# Patient Record
Sex: FEMALE | Race: WHITE | Employment: FULL TIME | URBAN - METROPOLITAN AREA
[De-identification: names, ages, dates, MRNs, and addresses within clinical notes are randomized per-mention and may not be internally consistent; named-entity substitution may affect disease eponyms.]

---

## 2018-01-29 ENCOUNTER — OFFICE VISIT (OUTPATIENT)
Dept: CARDIOLOGY CLINIC | Age: 48
End: 2018-01-29

## 2018-01-29 VITALS
BODY MASS INDEX: 37.44 KG/M2 | SYSTOLIC BLOOD PRESSURE: 142 MMHG | RESPIRATION RATE: 18 BRPM | HEART RATE: 80 BPM | HEIGHT: 69 IN | WEIGHT: 252.8 LBS | DIASTOLIC BLOOD PRESSURE: 82 MMHG

## 2018-01-29 DIAGNOSIS — L40.50 PSORIATIC ARTHRITIS (HCC): ICD-10-CM

## 2018-01-29 DIAGNOSIS — E78.01 FAMILIAL HYPERCHOLESTEREMIA: ICD-10-CM

## 2018-01-29 DIAGNOSIS — M79.7 FIBROMYALGIA: ICD-10-CM

## 2018-01-29 DIAGNOSIS — R06.09 DOE (DYSPNEA ON EXERTION): Primary | ICD-10-CM

## 2018-01-29 DIAGNOSIS — K75.81 NASH (NONALCOHOLIC STEATOHEPATITIS): ICD-10-CM

## 2018-01-29 DIAGNOSIS — E66.09 CLASS 2 OBESITY DUE TO EXCESS CALORIES WITH BODY MASS INDEX (BMI) OF 37.0 TO 37.9 IN ADULT, UNSPECIFIED WHETHER SERIOUS COMORBIDITY PRESENT: ICD-10-CM

## 2018-01-29 DIAGNOSIS — E78.5 DYSLIPIDEMIA: ICD-10-CM

## 2018-01-29 RX ORDER — TRAMADOL HYDROCHLORIDE 100 MG/1
200 TABLET, EXTENDED RELEASE ORAL AS NEEDED
COMMUNITY

## 2018-01-29 RX ORDER — DULOXETIN HYDROCHLORIDE 60 MG/1
60 CAPSULE, DELAYED RELEASE ORAL DAILY
COMMUNITY
End: 2018-03-19 | Stop reason: SDUPTHER

## 2018-01-29 RX ORDER — DULOXETIN HYDROCHLORIDE 30 MG/1
30 CAPSULE, DELAYED RELEASE ORAL DAILY
COMMUNITY
End: 2018-03-19 | Stop reason: ALTCHOICE

## 2018-01-29 NOTE — PATIENT INSTRUCTIONS
Eat a plant-based diet. Avoid processed and packaged foods.      If you stress test is normal, then schedule a CT heart scan: Call [phone] to schedule

## 2018-01-29 NOTE — MR AVS SNAPSHOT
[address] [phone] Patient: Celina Renee MRN: [id number] :1970 Visit Information Date & Time Provider Department Dept. Phone Encounter #  
 2018  8:40 AM Cindra Cockayne, MD [facility] [address] [phone] [id number] Upcoming Health Maintenance Date Due DTaP/Tdap/Td series (1 - Tdap) 1993 PAP AKA CERVICAL CYTOLOGY 1993 Influenza Age 5 to Adult 2017 Allergies as of 2018  Review Complete On: 2018 By: Angle Ramon Severity Noted Reaction Type Reactions Bactrim [Sulfamethoprim Ds] High 2016    Anaphylaxis Sulfa (Sulfonamide Antibiotics) High 2018    Anaphylaxis Current Immunizations  Never Reviewed No immunizations on file. Not reviewed this visit You Were Diagnosed With   
  
 Codes Comments Essential hypertension    -  Primary ICD-10-CM: I10 
ICD-9-CM: 401.9 SOB (shortness of breath)     ICD-10-CM: R06.02 
ICD-9-CM: 786.05 Dyslipidemia     ICD-10-CM: E78.5 ICD-9-CM: 272.4 HILL (dyspnea on exertion)     ICD-10-CM: R06.09 
ICD-9-CM: 786.09   
 HAZEL (nonalcoholic steatohepatitis)     ICD-10-CM: M88.62 ICD-9-CM: 571.8 Psoriatic arthritis ([facility]     ICD-10-CM: L40.50 ICD-9-CM: 696.0 Fibromyalgia     ICD-10-CM: M79.7 ICD-9-CM: 729.1 Class 2 obesity due to excess calories with body mass index (BMI) of 37.0 to 37.9 in adult, unspecified whether serious comorbidity present     ICD-10-CM: E66.09, I81.83 ICD-9-CM: 278.00, V85.37 Vitals BP Pulse Resp Height(growth percentile) Weight(growth percentile) BMI  
 142/82 (BP 1 Location: Right arm) 80 18 5' 9\" (1.753 m) 252 lb 12.8 oz (114.7 kg) 37.33 kg/m2 OB Status Smoking Status Hysterectomy Former Smoker Vitals History BMI and BSA Data Body Mass Index Body Surface Area 37.33 kg/m 2 2.36 m 2 Preferred Pharmacy Pharmacy Name Phone [facility] [phone] Your Updated Medication List  
  
   
This list is accurate as of: 1/29/18  9:27 AM.  Always use your most recent med list.  
  
  
  
  
 amitriptyline 10 mg tablet Commonly known as:  ELAVIL Take  by mouth nightly as needed for Sleep. * CYMBALTA 60 mg capsule Generic drug:  DULoxetine Take 60 mg by mouth daily. * CYMBALTA 30 mg capsule Generic drug:  DULoxetine Take 30 mg by mouth daily. * DULoxetine 60 mg capsule Commonly known as:  CYMBALTA Take 1 Cap by mouth daily. Take one 60 mg cap along with one 30 mg cap to equal 90 mg daily  Indications: FIBROMYALGIA  
  
 gabapentin 800 mg tablet Commonly known as:  NEURONTIN Take 900 mg by mouth three (3) times daily. HUMIRA 40 mg/0.8 mL injection Generic drug:  adalimumab  
by SubCUTAneous route Once every 2 weeks. traMADol 100 mg Tb24 Commonly known as:  ULTRAM-ER Take 200 mg by mouth as needed. VITAMIN D3 PO Take 6,000 Units by mouth daily. * Notice: This list has 3 medication(s) that are the same as other medications prescribed for you. Read the directions carefully, and ask your doctor or other care provider to review them with you. We Performed the Following AMB POC EKG ROUTINE W/ 12 LEADS, INTER & REP [04616 CPT(R)] Patient Instructions Eat a plant-based diet. Avoid processed and packaged foods. If you stress test is normal, then schedule a CT heart scan: Call [facility] to schedule Introducing [facility]! Delmar Caceres introduces [employer] patient portal. Now you can access parts of your medical record, email your doctor's office, and request medication refills online. 1. In your internet browser, go to [email or web address] 2. Click on the First Time User? Click Here link in the Sign In box.  You will see the New Member Sign Up page. 3. Enter your CouponCabin Access Code exactly as it appears below. You will not need to use this code after youve completed the sign-up process. If you do not sign up before the expiration date, you must request a new code. · C[employer] Access Code: [email or web address] Expires: 4/29/2018  9:26 AM 
 
4. Enter the last four digits of your Social Security Number (xxxx) and Date of Birth (mm/dd/yyyy) as indicated and click Submit. You will be taken to the next sign-up page. 5. Create a CouponCabin ID. This will be your CouponCabin login ID and cannot be changed, so think of one that is secure and easy to remember. 6. Create a CouponCabin password. You can change your password at any time. 7. Enter your Password Reset Question and Answer. This can be used at a later time if you forget your password. 8. Enter your e-mail address. You will receive e-mail notification when new information is available in [address]. 9. Click Sign Up. You can now view and download portions of your medical record. 10. Click the Download Summary menu link to download a portable copy of your medical information. If you have questions, please visit the Frequently Asked Questions section of the CouponCabin website. Remember, CouponCabin is NOT to be used for urgent needs. For medical emergencies, dial 911. Now available from your iPhone and Android! Please provide this summary of care documentation to your next provider. Your primary care clinician is listed as Johnie Silverman. If you have any questions after today's visit, please call [phone].

## 2018-01-29 NOTE — PROGRESS NOTES
Sofiya Lea MD [facility]  [address] [facility], [address] [facility]    Office [phone]  Fax [phone]  Cell [phone]        Manish Lodnon is a 41 y.o. female self-referred for evaluation of HILL and dyslipidemia. Assessment  Encounter Diagnoses   Name Primary?  Dyslipidemia     HILL (dyspnea on exertion) Yes    HAZEL (nonalcoholic steatohepatitis)     Psoriatic arthritis (HCC)     Fibromyalgia     Class 2 obesity due to excess calories with body mass index (BMI) of 37.0 to 37.9 in adult, unspecified whether serious comorbidity present     Familial hypercholesteremia        Recommendations:    Ramírez Reynolds ([facility]) has familial dyslipidemia, obesity, HAZEL coupled with strong family hx of early CAD. She has chronic HILL but no anginal chest pain. Her exam and EKG are normal. Will evaluate further with stress echo, consideration of CT heart scan if this is normal. Update lipids with advanced testing using CHL. Regroup to review. We had a long discussion about the importance of diet and lifestyle to improve her risk profile. I am concerned her underlying psoriatic arthritis and FM is contributing to her overall inflammation. Subjective:    Ms. Irais Mays has chronic dyslipidemia and HAZEL, but no hx of heart disease. She reports HILL, fatigue, and \"head pounding\" with exercise, including brisk walking, and will have to sit down to rest.     Most recent lipids from December 2017,  , HDL 92 , . Her brother recently had a MI at age 37. Both of her parents had MIs in their 46s. She suffers from psoriatic arthritis and myalgias. Patient denies any exertional chest pain, palpitations, syncope, orthopnea, edema or paroxysmal nocturnal dyspnea.     Cardiac risk factors   HTN no  DM no  Family hx of CAD yes- both parents with MIs in their 46s, brother with MI at 37    Cardiac testing  No specialty comments available. Past Medical History:   Diagnosis Date    Hypercholesterolemia         Current Outpatient Prescriptions   Medication Sig Dispense Refill    DULoxetine (CYMBALTA) 60 mg capsule Take 60 mg by mouth daily.  DULoxetine (CYMBALTA) 30 mg capsule Take 30 mg by mouth daily.  traMADol (ULTRAM-ER) 100 mg Tb24 Take 200 mg by mouth as needed.  adalimumab (HUMIRA) 40 mg/0.8 mL injection by SubCUTAneous route Once every 2 weeks.  CHOLECALCIFEROL, VITAMIN D3, (VITAMIN D3 PO) Take 6,000 Units by mouth daily.  DULoxetine (CYMBALTA) 60 mg capsule Take 1 Cap by mouth daily. Take one 60 mg cap along with one 30 mg cap to equal 90 mg daily  Indications: FIBROMYALGIA 90 Cap 1    gabapentin (NEURONTIN) 800 mg tablet Take 900 mg by mouth three (3) times daily.  amitriptyline (ELAVIL) 10 mg tablet Take  by mouth nightly as needed for Sleep. 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 +fatigue  Respiratory: Negative for cough, hemoptysis, sputum production and wheezing. +HILL  Cardiovascular: Negative for chest pain, palpitations, orthopnea, claudication, leg swelling and PND. Gastrointestinal: Negative for heartburn, nausea, vomiting, blood in stool and melena. Genitourinary: Negative for dysuria and flank pain. Musculoskeletal: Negative for joint pain and back pain. Skin: Negative for rash. Neurological: Negative for focal weakness, seizures, loss of consciousness, weakness. +\"head pounding\" with exertion  Endo/Heme/Allergies: Does not bruise/bleed easily. Psychiatric/Behavioral: Negative for memory loss. The patient does not have insomnia.       Physical Exam    Visit Vitals    /82 (BP 1 Location: Right arm)    Pulse 80    Resp 18    Ht 5' 9\" (1.753 m)    Wt 252 lb 12.8 oz (114.7 kg)    BMI 37.33 kg/m2     Wt Readings from Last 3 Encounters:   01/29/18 252 lb 12.8 oz (114.7 kg)   02/03/16 235 lb (106.6 kg)      General - well developed well nourished  Neck - JVP normal, thyroid nl  Cardiac - normal S1, S2, no murmurs, rubs or gallops.  No clicks  Vascular - carotids without bruits, radials, femorals and pedal pulses equal bilateral  Lungs - clear to auscultation bilaterals, no rales, wheezing or rhonchi  Abd - soft nontender, no HSM, no abd bruits  Extremities - no edema  Skin - no rash  Neuro - nonfocal  Psych - normal mood and affect      Cardiographics  EKG 1/29/18- SR, normal    Written by Shyanne Blanco, as dictated by Jany Chavez MD.  Jany Chavez MD

## 2018-02-06 ENCOUNTER — CLINICAL SUPPORT (OUTPATIENT)
Dept: CARDIOLOGY CLINIC | Age: 48
End: 2018-02-06

## 2018-02-06 DIAGNOSIS — E78.5 DYSLIPIDEMIA: ICD-10-CM

## 2018-02-06 DIAGNOSIS — E78.01 FAMILIAL HYPERCHOLESTEREMIA: ICD-10-CM

## 2018-02-06 DIAGNOSIS — E66.09 CLASS 2 OBESITY DUE TO EXCESS CALORIES WITH BODY MASS INDEX (BMI) OF 37.0 TO 37.9 IN ADULT, UNSPECIFIED WHETHER SERIOUS COMORBIDITY PRESENT: ICD-10-CM

## 2018-02-06 DIAGNOSIS — R06.09 DOE (DYSPNEA ON EXERTION): Primary | ICD-10-CM

## 2018-02-06 NOTE — PROGRESS NOTES
See scanned report. Dr. Almita Go ordered study and Dr. Almita Go read study. ID verified per protocol. Explained procedure to patient. All concerns and questions addressed prior to obtaining consent test. Patient developed dyspnea and chest tightness \"5\" during test. At 7:30 in recovery, patient without symptoms or complaints voiced.

## 2018-02-08 ENCOUNTER — TELEPHONE (OUTPATIENT)
Dept: CARDIOLOGY CLINIC | Age: 48
End: 2018-02-08

## 2018-02-08 NOTE — TELEPHONE ENCOUNTER
Ren Harmon from [facility] calling in regards to the pt's blood work. She said the lab is unable to run the following tests due being out of a sample: apoa, apob, acmp, hscrp, insulin, lipid panel, LPa, abnp, & sdldl. She said if there are any questions please give her a call back @ [phone]. Thanks!   Merry Sarkar

## 2018-02-08 NOTE — TELEPHONE ENCOUNTER
Patient will have her labs redrawn. Confirmed with Jasmyn Douglas from [facility] that she will not be charged an additional draw fee.

## 2018-02-12 ENCOUNTER — TELEPHONE (OUTPATIENT)
Dept: CARDIOLOGY CLINIC | Age: 48
End: 2018-02-12

## 2018-02-12 NOTE — TELEPHONE ENCOUNTER
Patient notified. She voices understanding. Confirmed that she has information for CT heart scan. Appointment 2/13/18 to have labs redrawn.

## 2018-02-12 NOTE — TELEPHONE ENCOUNTER
Cardiac testing  Exercise Stress Echo 2/6/18 - 9:30. Moderate chest pain reported during peak exercise. Target HR was achieved. Negative stress EKG. EF 60%. Normal study. Ms. Kelsie Campbell was seen as a new patient by Dr. Elizabeth Nelson on 1/29/18. Self referred for evaluation of HILL and dyslipidemia. I have left a voicemail for her to review normal Exercise stress echo results. As discussed with Dr. Elizabeth Nelson, further evaluation for subclinical CAD with CT heart scan could be considered. Advanced lipid studies are pending. Plan to phone follow up to review these results once available.

## 2018-02-13 ENCOUNTER — TELEPHONE (OUTPATIENT)
Dept: CARDIOLOGY CLINIC | Age: 48
End: 2018-02-13

## 2018-02-13 NOTE — TELEPHONE ENCOUNTER
Chaz Roman from [facility] calling to iris an emended report. Please give her a call back @ [phone] option 1. Thanks!   Sita Kline

## 2018-02-14 LAB
% ALBUMIN, 58A: 61 % (ref 54–71)
25(OH)D3 SERPL-MCNC: 21 NG/ML (ref 30–100)
ALB/GLOBRATIO, 58C: 1.57 (ref 1.15–2.5)
ALBUMIN SERPL-MCNC: 4.5 G/DL (ref 3.7–5.1)
ALP SERPL-CCNC: 100 U/L (ref 35–125)
ALT SERPL-CCNC: 45 U/L
ANION GAP SERPL CALC-SCNC: 13 MMOL/L (ref 6–18)
APOLIPOPROTEIN A-1, 6: 142 MG/DL
APOLIPOPROTEIN B , 48: 145 MG/DL
AST SERPL W P-5'-P-CCNC: 29 U/L (ref 5–32)
BILIRUB SERPL-MCNC: 0.6 MG/DL
BUN SERPL-MCNC: 16 MG/DL (ref 6–20)
BUN/CREATININE RATIO,BUCR: 20 (ref 10–27)
CALCIUM SERPL-MCNC: 10 MG/DL (ref 8.8–10.5)
CHLORIDE SERPL-SCNC: 103 MMOL/L (ref 98–110)
CHOLEST SERPL-MCNC: 265 MG/DL
CO2 SERPL-SCNC: 25 MMOL/L (ref 19–31)
CREAT SERPL-MCNC: 0.8 MG/DL (ref 0.5–0.9)
CRP SERPL HS-MCNC: 14.5 MG/L
EGFRAACREAT: 108 ML/MIN/1.73M^2
EGFRNACREAT: 93 ML/MIN/1.73M^2
FFA FREE FATTY ACIDS, 64: 0.48 MMOL/L
GLOBCALC, 58B: 2.9 G/DL (ref 1.9–3.5)
GLUCOSE SERPL-MCNC: 106 MG/DL (ref 70–99)
HDL 2 SUBCLASS, 65: 11 MG/DL
HDLC SERPL-MCNC: 59 MG/DL
HOMOCYSTEINE,PLASMA,HOMCY: 11 UMOL/L
INSULIN,INS: 23 UU/ML (ref 3–9)
LDLC SERPL CALC-MCNC: 194 MG/DL
LP-PLA2, 123241: 241 NG/ML
MPOAU: 373 PMOL/L
NON-HDL CHOLESTEROL, 011976: 207 MG/DL
POTASSIUM SERPL-SCNC: 4.2 MMOL/L (ref 3.5–5.3)
PRO BNP NT,BNPNT: 21 PG/ML
PROT SERPL-MCNC: 7.4 G/DL (ref 6.1–8)
SMALL DENSE LDL, 47: 60 MG/DL
SODIUM SERPL-SCNC: 141 MMOL/L (ref 133–145)
TRIGL SERPL-MCNC: 126 MG/DL (ref ?–150)

## 2018-02-15 LAB
CAMPESTEROL, HDL1302: 2.89 UG/ML (ref 2.11–4.43)
CHOLESTANOL, HDL1304: 3.16 UG/ML (ref 2.02–3.47)
DESMOSTEROL, HDL1306: 1.38 UG/ML
HDL HDL-P, HDL5001: 42.5 UMOL/L
HDL LDL-P, HDL5000: 2726 NMOL/L
HDL SLDL-P, HDL5002: 1216 NMOL/L
LP(A)-P, HDL4000: 254 NMOL/L
SITOSTEROL, HDL1300: 2.11 UG/ML (ref 1.43–3.17)

## 2018-02-16 LAB
AA2: 16.68 % (ref 10.5–23.3)
ALPHA LINOLEIC ACID N3, HDL1202: 0.12 % (ref 0.1–0.4)
CIS-MONO-UNSATURATED FATTY ACID TOTAL, HDL1205: 15.2 (ref 11.5–20.5)
DOCOSAHEXAENOIC ACID N3, HDL1208: 4.01 % (ref 0.1–8.4)
DOCOSAPENTAENOIC ACID N3, HDL1206: 2.49 % (ref 0.6–4.1)
DOCOSAPENTAENOIC ACID N6, HDL1207: 0.84 % (ref 0.1–1.3)
EPA2: 0.71 % (ref 0.1–2.5)
LINOLEIC ACID C6, HDL1216: 12.34 % (ref 4.6–21.3)
OMEGA-3 FATTY ACID TOTAL, HDL1220: 7.3 (ref 0.1–14.1)
OMEGA-3 INDEX, HDL1219: 4.7 (ref 0.1–10.4)
OMEGA-6 FATTY ACID TOTAL, HDL1222: 36.2 (ref 28.6–44.5)
SATURATED FATTY ACID TOTAL, HDL1226: 40.7 (ref 36.6–42)
TRANS FATTY ACID TOTAL, HDL1232: 0.6 (ref 0.1–1.8)
TRANSLINOLEIC ACID, HDL1229: <0.1 %
TRANSOLEIC ACID, HDL1230: 0.39 % (ref 0.1–1.3)

## 2018-02-28 ENCOUNTER — TELEPHONE (OUTPATIENT)
Dept: CARDIOLOGY CLINIC | Age: 48
End: 2018-02-28

## 2018-02-28 NOTE — TELEPHONE ENCOUNTER
[facility] were reviewed - evidence of familial dyslipidemia, IR and Vit D deficiency. She still plans to have CT Heart scan performed in the near future. I have asked her to return to the office in the next few weeks, following CT heart scan, so that we can review that data and recent lab work in person. She is agreeable with this plan. Will have our office contact her to schedule follow up visit.

## 2018-03-02 ENCOUNTER — HOSPITAL ENCOUNTER (OUTPATIENT)
Dept: CT IMAGING | Age: 48
Discharge: HOME | End: 2018-03-02
Payer: SELF-PAY

## 2018-03-02 DIAGNOSIS — Z00.00 PREVENTATIVE HEALTH CARE: ICD-10-CM

## 2018-03-02 PROCEDURE — 75571 CT HRT W/O DYE W/CA TEST: CPT

## 2018-03-05 NOTE — CARDIO/PULMONARY
Cardiac Rehab: Attempted to contact pt about calcium scoring results. Left message on cell phone voice mail. Encouraged pt to check results on My Chart or contact Dr Jayla Zhang office. Sent copy of report to pt with handout on learning about CAD. Sent Dr Raheem Howard message regarding results via [facility].

## 2018-03-19 ENCOUNTER — OFFICE VISIT (OUTPATIENT)
Dept: CARDIOLOGY CLINIC | Age: 48
End: 2018-03-19

## 2018-03-19 VITALS
SYSTOLIC BLOOD PRESSURE: 150 MMHG | HEIGHT: 69 IN | WEIGHT: 253 LBS | HEART RATE: 96 BPM | BODY MASS INDEX: 37.47 KG/M2 | DIASTOLIC BLOOD PRESSURE: 80 MMHG

## 2018-03-19 DIAGNOSIS — R93.1 AGATSTON CAC SCORE, <100: ICD-10-CM

## 2018-03-19 DIAGNOSIS — E66.09 CLASS 2 OBESITY DUE TO EXCESS CALORIES WITH BODY MASS INDEX (BMI) OF 37.0 TO 37.9 IN ADULT, UNSPECIFIED WHETHER SERIOUS COMORBIDITY PRESENT: ICD-10-CM

## 2018-03-19 DIAGNOSIS — E78.01 FAMILIAL HYPERCHOLESTEREMIA: Primary | ICD-10-CM

## 2018-03-19 DIAGNOSIS — E78.41 ELEVATED LP(A): ICD-10-CM

## 2018-03-19 DIAGNOSIS — L40.50 PSORIATIC ARTHRITIS (HCC): ICD-10-CM

## 2018-03-19 DIAGNOSIS — E66.01 SEVERE OBESITY (BMI 35.0-39.9) WITH COMORBIDITY (HCC): ICD-10-CM

## 2018-03-19 DIAGNOSIS — E55.9 VITAMIN D DEFICIENCY: ICD-10-CM

## 2018-03-19 DIAGNOSIS — K75.81 NASH (NONALCOHOLIC STEATOHEPATITIS): ICD-10-CM

## 2018-03-19 PROBLEM — R06.09 DOE (DYSPNEA ON EXERTION): Status: RESOLVED | Noted: 2018-01-29 | Resolved: 2018-03-19

## 2018-03-19 RX ORDER — TIZANIDINE HYDROCHLORIDE 4 MG/1
4 CAPSULE, GELATIN COATED ORAL
COMMUNITY

## 2018-03-19 RX ORDER — METFORMIN HYDROCHLORIDE 500 MG/1
1000 TABLET, EXTENDED RELEASE ORAL
Qty: 60 TAB | Refills: 3 | Status: SHIPPED | OUTPATIENT
Start: 2018-03-19 | End: 2018-07-23 | Stop reason: SDUPTHER

## 2018-03-19 RX ORDER — ERGOCALCIFEROL 1.25 MG/1
50000 CAPSULE ORAL
Qty: 8 CAP | Refills: 2 | Status: SHIPPED | OUTPATIENT
Start: 2018-03-22 | End: 2018-07-23 | Stop reason: SDUPTHER

## 2018-03-19 RX ORDER — ROSUVASTATIN CALCIUM 20 MG/1
20 TABLET, COATED ORAL
Qty: 30 TAB | Refills: 5 | Status: SHIPPED | OUTPATIENT
Start: 2018-03-19 | End: 2018-07-23 | Stop reason: SDUPTHER

## 2018-03-19 NOTE — PATIENT INSTRUCTIONS
Start Crestor 20 mg daily    Start Vitamin D2 50,000 units twice weekly x 3 months    Stop Vitamin D3    Start CoQ10 100 mg twice daily (Arturo brand is fine)    Start Metformin  mg daily x 1 month then 1000 mg daily thereafter    Continue Aspirin 81 mg daily

## 2018-03-19 NOTE — PROGRESS NOTES
[address]  [address]  [place], [address] [facility]    Office [phone]  Fax [phone]  Cell [phone]        Erik Mayer is a 41 y.o. female last seen 2 months ago. Assessment  Encounter Diagnoses   Name Primary?  Class 2 obesity due to excess calories with body mass index (BMI) of 37.0 to 37.9 in adult, unspecified whether serious comorbidity present     Elevated Lp(a)     HAZEL (nonalcoholic steatohepatitis)     Psoriatic arthritis (HCC)     Severe obesity (BMI 35.0-39. 9) with comorbidity ([facility]     Vitamin D deficiency     Agatston CAC score, <100     Familial hypercholesteremia Yes       Recommendations:    Subclinical CAD by CT heart scan, low CAC (3). Stress echo normal. Reviewed sxs that would raise concern. Familial dyslipidemia coupled with IR. 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 Start Crestor 20 mg/d +Co Q10 100 mg BID. Long discussion about clean eating and healthy weight loss. Refer to [facility]  for nutritional counseling. Prediabetes. Start Metformin  mg/d. Nutrition advised as above. Low Vitamin D despite Vitamin D3 6k units daily. Replace with Vitamin D2 50,000 units twice weekly x3 months. Elevated CRP. Likely due to systemic illness. Update labs in 3 months. Follow-up Disposition:  Return in about 3 months (around 6/19/2018). [facility] labs prior     Subjective:    Ms. Yuliana Elder has chronic dyslipidemia and HAZEL, but no hx of heart disease. She did have a random spell of chest pain, but her EKG at the time was normal. She denies any exertional symptoms. She is taking 6000 units of Vitamin D a day. She does eat a low-sodium diet. Patient denies any exertional chest pain, palpitations, syncope, orthopnea, edema or paroxysmal nocturnal dyspnea.     Lab Results   Component Value Date/Time    Cholesterol, total 265 (H) 02/13/2018 09:00 AM    HDL Cholesterol 59 02/13/2018 09:00 AM LDL, calculated 194 (H) 02/13/2018 09:00 AM    Triglyceride 126 02/13/2018 09:00 AM     Her brother recently had a MI at age 37. Both of her parents had MIs in their 46s. She suffers from psoriatic arthritis and fibromyalgia. Cardiac risk factors   HTN no  DM no  Family hx of CAD yes- both parents with MIs in their 46s, brother with MI at 37    Cardiac testing  Exercise Stress Echo 2/6/18 - 9:30. Moderate chest pain reported during peak exercise. Target HR was achieved. Negative stress EKG. EF 60%. Normal study. CT heart scan 3/2/18 - 3 (LAD)    Past Medical History:   Diagnosis Date    Elevated Lp(a) 3/19/2018    Hypercholesterolemia         Current Outpatient Prescriptions   Medication Sig Dispense Refill    tiZANidine (ZANAFLEX) 4 mg capsule Take 4 mg by mouth nightly.  ergocalciferol (ERGOCALCIFEROL) 50,000 unit capsule Take 1 Cap by mouth two (2) days a week. 8 Cap 2    rosuvastatin (CRESTOR) 20 mg tablet Take 1 Tab by mouth nightly. 30 Tab 5    metFORMIN ER (GLUCOPHAGE XR) 500 mg tablet Take 2 Tabs by mouth daily (with dinner). 60 Tab 3    traMADol (ULTRAM-ER) 100 mg Tb24 Take 200 mg by mouth as needed.  adalimumab (HUMIRA) 40 mg/0.8 mL injection by SubCUTAneous route Once every 2 weeks.  DULoxetine (CYMBALTA) 60 mg capsule Take 1 Cap by mouth daily. 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 +fatigue  Respiratory: Negative for cough, hemoptysis, sputum production and wheezing. +HILL  Cardiovascular: Negative for chest pain, palpitations, orthopnea, claudication, leg swelling and PND.   Gastrointestinal: Negative for heartburn, nausea, vomiting, blood in stool and melena. Genitourinary: Negative for dysuria and flank pain. Musculoskeletal: Negative for joint pain and back pain. Skin: Negative for rash. Neurological: Negative for focal weakness, seizures, loss of consciousness, weakness. +\"head pounding\" with exertion  Endo/Heme/Allergies: Does not bruise/bleed easily. Psychiatric/Behavioral: Negative for memory loss. The patient does not have insomnia. Physical Exam    Visit Vitals    /80    Pulse 96    Ht 5' 9\" (1.753 m)    Wt 253 lb (114.8 kg)    BMI 37.36 kg/m2     Wt Readings from Last 3 Encounters:   03/19/18 253 lb (114.8 kg)   01/29/18 252 lb 12.8 oz (114.7 kg)   02/03/16 235 lb (106.6 kg)      General - well developed well nourished  Neck - JVP normal, thyroid nl  Cardiac - normal S1, S2, no murmurs, rubs or gallops.  No clicks  Vascular - carotids without bruits, radials, femorals and pedal pulses equal bilateral  Lungs - clear to auscultation bilaterals, no rales, wheezing or rhonchi  Abd - soft nontender, no HSM, no abd bruits  Extremities - no edema  Skin - no rash  Neuro - nonfocal  Psych - normal mood and affect      Cardiographics  EKG 1/29/18- SR, normal  CT heart scan 3/2/18 - 3 (LAD)    Written by Saji Solomon, as dictated by True Lafleur MD.  True Lafleur MD

## 2018-03-19 NOTE — PROGRESS NOTES
Visit Vitals    /80    Pulse 96    Ht 5' 9\" (1.753 m)    Wt 253 lb (114.8 kg)    BMI 37.36 kg/m2     Medication changes for this OV VO Dr Kavitha Clay

## 2018-03-19 NOTE — MR AVS SNAPSHOT
[address] [facility] [address] [facility] 
[phone] Patient: Royal Avila MRN: [id number] :1970 Visit Information Date & Time Provider Department Dept. Phone Encounter #  
 3/19/2018  3:20 PM Lamonte Jenkins MD [facility] [address] [phone] [id number] Follow-up Instructions Return in about 3 months (around 2018). Your Appointments 2018  2:40 PM  
ESTABLISHED PATIENT with Lamonte Jenkins MD  
[facility] (LORE SCHEDULING) Appt Note: 3m f/u  
 [facility] [address] [facility]  
[address] [facility] [address] [address] Upcoming Health Maintenance Date Due DTaP/Tdap/Td series (1 - Tdap) 1993 PAP AKA CERVICAL CYTOLOGY 1993 Influenza Age 5 to Adult 2017 Allergies as of 3/19/2018  Review Complete On: 3/19/2018 By: Yury Mallory LPN Severity Noted Reaction Type Reactions Bactrim [Sulfamethoprim Ds] High 2016    Anaphylaxis Sulfa (Sulfonamide Antibiotics) High 2018    Anaphylaxis Current Immunizations  Never Reviewed No immunizations on file. Not reviewed this visit You Were Diagnosed With   
  
 Codes Comments Dyslipidemia    -  Primary ICD-10-CM: E78.5 ICD-9-CM: 272.4 HILL (dyspnea on exertion)     ICD-10-CM: R06.09 
ICD-9-CM: 786.09 Class 2 obesity due to excess calories with body mass index (BMI) of 37.0 to 37.9 in adult, unspecified whether serious comorbidity present     ICD-10-CM: E66.09, X93.52 ICD-9-CM: 278.00, V85.37 Vitals BP Pulse Height(growth percentile) Weight(growth percentile) BMI OB Status 150/80 96 5' 9\" (1.753 m) 253 lb (114.8 kg) 37.36 kg/m2 Hysterectomy Smoking Status Former Smoker Vitals History BMI and BSA Data Body Mass Index Body Surface Area  
 37.36 kg/m 2 2.36 m 2 Preferred Pharmacy Pharmacy Name Phone [facility] [address], [address] [place] [address] [phone] Your Updated Medication List  
  
   
This list is accurate as of 3/19/18  3:53 PM.  Always use your most recent med list.  
  
  
  
  
 DULoxetine 60 mg capsule Commonly known as:  CYMBALTA Take 1 Cap by mouth daily. Take one 60 mg cap along with one 30 mg cap to equal 90 mg daily  Indications: FIBROMYALGIA  
  
 HUMIRA 40 mg/0.8 mL injection Generic drug:  adalimumab  
by SubCUTAneous route Once every 2 weeks. traMADol 100 mg Tb24 Commonly known as:  ULTRAM-ER Take 200 mg by mouth as needed. VITAMIN D3 PO Take 6,000 Units by mouth daily. ZANAFLEX 4 mg capsule Generic drug:  tiZANidine Take 4 mg by mouth nightly. Follow-up Instructions Return in about 3 months (around 6/19/2018). Patient Instructions Start Crestor 20 mg daily Start Vitamin D2 50,000 units twice weekly x 3 months Stop Vitamin D3 Start CoQ10 100 mg twice daily (Arturo brand is fine) Start Metformin  mg daily x 1 month then 1000 mg daily thereafter Continue Aspirin 81 mg daily Introducing [facility]! Clover Vidal introduces [employer] patient portal. Now you can access parts of your medical record, email your doctor's office, and request medication refills online. 1. In your internet browser, go to [email or web address] Dream 2. Click on the First Time User? Click Here link in the Sign In box. You will see the New Member Sign Up page. 3. Enter your [employer] Access Code exactly as it appears below. You will not need to use this code after youve completed the sign-up process. If you do not sign up before the expiration date, you must request a new code. · [employer] Access Code: [email or web address] Expires: 4/29/2018 10:26 AM 
 
 4. Enter the last four digits of your Social Security Number (xxxx) and Date of Birth (mm/dd/yyyy) as indicated and click Submit. You will be taken to the next sign-up page. 5. Create a Hungry Local ID. This will be your [employer]ry Local login ID and cannot be changed, so think of one that is secure and easy to remember. 6. Create a [employer]ry Local password. You can change your password at any time. 7. Enter your Password Reset Question and Answer. This can be used at a later time if you forget your password. 8. Enter your e-mail address. You will receive e-mail notification when new information is available in [address] 9. Click Sign Up. You can now view and download portions of your medical record. 10. Click the Download Summary menu link to download a portable copy of your medical information. If you have questions, please visit the Frequently Asked Questions section of the [employer]ry Local website. Remember, [employer] is NOT to be used for urgent needs. For medical emergencies, dial 911. Now available from your iPhone and Android! Please provide this summary of care documentation to your next provider. Your primary care clinician is listed as Stephenie Rivas. If you have any questions after today's visit, please call [phone].

## 2018-06-12 ENCOUNTER — DOCUMENTATION ONLY (OUTPATIENT)
Dept: CARDIOLOGY CLINIC | Age: 48
End: 2018-06-12

## 2018-07-23 ENCOUNTER — OFFICE VISIT (OUTPATIENT)
Dept: CARDIOLOGY CLINIC | Age: 48
End: 2018-07-23

## 2018-07-23 VITALS
WEIGHT: 257 LBS | HEART RATE: 83 BPM | OXYGEN SATURATION: 99 % | SYSTOLIC BLOOD PRESSURE: 140 MMHG | DIASTOLIC BLOOD PRESSURE: 84 MMHG | BODY MASS INDEX: 38.06 KG/M2 | HEIGHT: 69 IN | RESPIRATION RATE: 18 BRPM

## 2018-07-23 DIAGNOSIS — E55.9 VITAMIN D DEFICIENCY: ICD-10-CM

## 2018-07-23 DIAGNOSIS — M79.7 FIBROMYALGIA: ICD-10-CM

## 2018-07-23 DIAGNOSIS — L40.50 PSORIATIC ARTHRITIS (HCC): ICD-10-CM

## 2018-07-23 DIAGNOSIS — K75.81 NASH (NONALCOHOLIC STEATOHEPATITIS): ICD-10-CM

## 2018-07-23 DIAGNOSIS — E66.01 SEVERE OBESITY (BMI 35.0-39.9) WITH COMORBIDITY (HCC): ICD-10-CM

## 2018-07-23 DIAGNOSIS — E78.41 ELEVATED LP(A): ICD-10-CM

## 2018-07-23 DIAGNOSIS — E66.09 CLASS 2 OBESITY DUE TO EXCESS CALORIES WITH BODY MASS INDEX (BMI) OF 37.0 TO 37.9 IN ADULT, UNSPECIFIED WHETHER SERIOUS COMORBIDITY PRESENT: ICD-10-CM

## 2018-07-23 DIAGNOSIS — E78.5 DYSLIPIDEMIA: Primary | ICD-10-CM

## 2018-07-23 DIAGNOSIS — R93.1 AGATSTON CAC SCORE, <100: ICD-10-CM

## 2018-07-23 DIAGNOSIS — E78.01 FAMILIAL HYPERCHOLESTEREMIA: ICD-10-CM

## 2018-07-23 RX ORDER — METFORMIN HYDROCHLORIDE 500 MG/1
1000 TABLET, EXTENDED RELEASE ORAL
Qty: 60 TAB | Refills: 3 | Status: SHIPPED | OUTPATIENT
Start: 2018-07-23

## 2018-07-23 RX ORDER — ROSUVASTATIN CALCIUM 20 MG/1
20 TABLET, COATED ORAL
Qty: 30 TAB | Refills: 5 | Status: SHIPPED | OUTPATIENT
Start: 2018-07-23

## 2018-07-23 RX ORDER — ERGOCALCIFEROL 1.25 MG/1
50000 CAPSULE ORAL
Qty: 8 CAP | Refills: 2 | Status: SHIPPED | OUTPATIENT
Start: 2018-07-26

## 2018-07-23 NOTE — PROGRESS NOTES
[address] [place], [address] [facility] Office [phone] Fax [phone] Will Free is a 41 y.o. female last seen 4 months ago. Assessment Encounter Diagnoses Name Primary?  HAZEL (nonalcoholic steatohepatitis)  Vitamin D deficiency  Severe obesity (BMI 35.0-39. 9) with comorbidity ([facility] [address]  Familial hypercholesteremia  Elevated Lp(a)  Dyslipidemia Yes  Class 2 obesity due to excess calories with body mass index (BMI) of 37.0 to 37.9 in adult, unspecified whether serious comorbidity present  Agatston CAC score, <100  Psoriatic arthritis ([facility] [address]  Fibromyalgia Recommendations: Ms. Valentine Newton has subclinical CAD by CT heart scan, low CAC (3). Stress echo 2/6/18 was normal. Reviewed sxs that would raise concern. 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 Update [facility] health labs in the near future. Phone follow up to review results. Strongly encouraged her to work towards a heart healthy diet low in simple carbohydrates and find some form on exercise that if amenable to her underlying inflammatory conditions. Encouraged her to continue current medications and call with any new complaints or concerns. Follow-up Disposition: 
Return in about 3 months (around 10/23/2018). Subjective: 
Ms. 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 She has recently changed jobs and finds that this role is less stressful. She has been unable to perform any additional exercise due to ongoing joint and back pain which she attributes to fibromyalgia and psoriatic arthritis. She is not following a structured dietary plan. She did begin taking Metformin and has successfully increased it up to 1000 mg with dinner. Unfortunately, she has seen a small weight gain since her last visit due.    
 
Additionally, she began Crestor 20 mg daily. She did not have repeat lab work. She denies any palpitations, syncope, orthopnea, edema or paroxysmal nocturnal dyspnea. Lab Results Component Value Date/Time Cholesterol, total 265 (H) 02/13/2018 09:00 AM  
 HDL Cholesterol 59 02/13/2018 09:00 AM  
 LDL, calculated 194 (H) 02/13/2018 09:00 AM  
 Triglyceride 126 02/13/2018 09:00 AM  
 
Cardiac risk factors HTN no 
DM no Family hx of CAD yes- both parents with MIs in their 46s, brother with MI at 37 Cardiac testing Exercise Stress Echo 2/6/18 - 9:30. Moderate chest pain reported during peak exercise. Target HR was achieved. Negative stress EKG. EF 60%. Normal study. CT heart scan 3/2/18 - 3 (LAD) Past Medical History:  
Diagnosis Date  Elevated Lp(a) 3/19/2018  Hypercholesterolemia Current Outpatient Prescriptions Medication Sig Dispense Refill  tiZANidine (ZANAFLEX) 4 mg capsule Take 4 mg by mouth nightly.  ergocalciferol (ERGOCALCIFEROL) 50,000 unit capsule Take 1 Cap by mouth two (2) days a week. 8 Cap 2  
 rosuvastatin (CRESTOR) 20 mg tablet Take 1 Tab by mouth nightly. 30 Tab 5  
 metFORMIN ER (GLUCOPHAGE XR) 500 mg tablet Take 2 Tabs by mouth daily (with dinner). 60 Tab 3  
 traMADol (ULTRAM-ER) 100 mg Tb24 Take 200 mg by mouth as needed.  DULoxetine (CYMBALTA) 60 mg capsule Take 1 Cap by mouth daily. Take one 60 mg cap along with one 30 mg cap to equal 90 mg daily  Indications: FIBROMYALGIA 90 Cap 1  
 adalimumab (HUMIRA) 40 mg/0.8 mL injection by SubCUTAneous route Once every 2 weeks. Allergies Allergen Reactions  Bactrim [Sulfamethoprim Ds] Anaphylaxis  Sulfa (Sulfonamide Antibiotics) Anaphylaxis Director of Nursing at [employer]. Review of Systems Constitutional: Negative for fever, chills and diaphoresis. +fatigue Respiratory: Negative for cough, hemoptysis, sputum production and wheezing.   
Cardiovascular: Negative for chest pain, palpitations, orthopnea, claudication, leg swelling and PND. Gastrointestinal: Negative for heartburn, nausea, vomiting, blood in stool and melena. Genitourinary: Negative for dysuria and flank pain. Musculoskeletal: + joint pain and back pain Skin: Negative for rash. Neurological: Negative for focal weakness, seizures, loss of consciousness, weakness. \"head pounding\" with exertion Endo/Heme/Allergies: Does not bruise/bleed easily. Psychiatric/Behavioral: Negative for memory loss. The patient does not have insomnia. Physical Exam 
Visit Vitals  /84 (BP 1 Location: Right arm, BP Patient Position: Sitting)  Pulse 83  Resp 18  Ht 5' 9\" (1.753 m)  Wt 257 lb (116.6 kg)  SpO2 99%  BMI 37.95 kg/m2 Wt Readings from Last 3 Encounters:  
07/23/18 257 lb (116.6 kg) 03/19/18 253 lb (114.8 kg) 01/29/18 252 lb 12.8 oz (114.7 kg) General - well developed well nourished Neck - JVP normal, thyroid nl Cardiac - normal S1, S2, no murmurs, rubs or gallops. No clicks Vascular - carotids without bruits, radials, femorals and pedal pulses equal bilateral 
Lungs - clear to auscultation bilaterals, no rales, wheezing or rhonchi Abd - soft nontender, no HSM, no abd bruits Extremities - no edema Skin - no rash Neuro - nonfocal 
Psych - normal mood and affect Cardiographics EKG 1/29/18- SR, normal 
 
JAVI Christianson MD

## 2018-07-23 NOTE — PROGRESS NOTES
All health maintenance and other pertinent information has been reviewed in preparation for today's office visit. Patient presents in the office today for:    Chief Complaint   Patient presents with    Follow-up     3 Month follow up: Elevated Lipids, Dyslipidemia, hypercholesteremia     1. Have you been to the ER, urgent care clinic since your last visit? Hospitalized since your last visit? No    2. Have you seen or consulted any other health care providers outside of the [address] [place] [address] since your last visit? Include any pap smears or colon screening.  No    Visit Vitals    /84 (BP 1 Location: Right arm, BP Patient Position: Sitting)    Pulse 83    Resp 18    Ht 5' 9\" (1.753 m)    Wt 257 lb (116.6 kg)    SpO2 99%    BMI 37.95 kg/m2

## 2018-07-23 NOTE — PATIENT INSTRUCTIONS
Please continue your current medications. Update your lab work in the near future. Fasting. I will call you with the results. Try eliminating gluten from your diet for 2-4 weeks to see if this helps with your inflammation.

## 2020-02-28 NOTE — PROGRESS NOTES
Labs drawn 2/13/18       High Risk Intermediate Risk Optimal   Total Cholesterol 265          HDL   59   TG   126   Non-     Apo B 145     LDL-P 2726     Small LDL-P 1216     sdLDL-C 60     Apo A-I  142    HDL-P   42.5   HDL2-C 11     Apo B: Apo A-I ratio      Lp(a)-P 254     Hs-CRP 14.5     Lp-PLA2   241   Myeloperoxidase 373     NT-proBNP   21   Apolipoprotein E      [id number]*1*9*      [id number]*17*      Factor V Leiden      Prothrombin Mutation      MTHFR      25-hydroxy-Vitamin D  21    Homocysteine  11    Creatinine, serum   0.8   Campesterol   2.89   Sitosterol   2.11   Cholestanol   3.16   Desmosterol 1.38 (hyper)     Glucose  106    Free Fatty Acid   0.48   Insulin 23     Omega 3  4.7 None